# Patient Record
Sex: FEMALE | Race: WHITE
[De-identification: names, ages, dates, MRNs, and addresses within clinical notes are randomized per-mention and may not be internally consistent; named-entity substitution may affect disease eponyms.]

---

## 2017-01-04 ENCOUNTER — HOSPITAL ENCOUNTER (EMERGENCY)
Dept: HOSPITAL 47 - EC | Age: 32
Discharge: HOME | End: 2017-01-04
Payer: MEDICARE

## 2017-01-04 VITALS — HEART RATE: 100 BPM | DIASTOLIC BLOOD PRESSURE: 89 MMHG | RESPIRATION RATE: 16 BRPM | SYSTOLIC BLOOD PRESSURE: 124 MMHG

## 2017-01-04 VITALS — TEMPERATURE: 97.3 F

## 2017-01-04 DIAGNOSIS — F31.9: ICD-10-CM

## 2017-01-04 DIAGNOSIS — R50.9: ICD-10-CM

## 2017-01-04 DIAGNOSIS — R07.9: ICD-10-CM

## 2017-01-04 DIAGNOSIS — R06.02: ICD-10-CM

## 2017-01-04 DIAGNOSIS — F41.0: Primary | ICD-10-CM

## 2017-01-04 DIAGNOSIS — F17.200: ICD-10-CM

## 2017-01-04 DIAGNOSIS — R45.851: ICD-10-CM

## 2017-01-04 DIAGNOSIS — Z88.0: ICD-10-CM

## 2017-01-04 LAB
PH UR: 6.5 [PH] (ref 5–8)
SP GR UR: 1 (ref 1–1.03)
UA BILLING (MACRO VS. MICRO): (no result)
UROBILINOGEN UR QL STRIP: <2 MG/DL (ref ?–2)

## 2017-01-04 PROCEDURE — 82075 ASSAY OF BREATH ETHANOL: CPT

## 2017-01-04 PROCEDURE — 80306 DRUG TEST PRSMV INSTRMNT: CPT

## 2017-01-04 PROCEDURE — 81003 URINALYSIS AUTO W/O SCOPE: CPT

## 2017-01-04 PROCEDURE — 99284 EMERGENCY DEPT VISIT MOD MDM: CPT

## 2017-01-04 NOTE — ED
Psych HPI





- General


Chief Complaint: Psychiatric Symptoms


Stated Complaint: Mental Health


Time Seen by Provider: 17 17:26


Source: patient, RN notes reviewed


Mode of arrival: ambulatory


Limitations: no limitations





- History of Present Illness


Initial Comments: 





31-year-old female presents emergency Department with chief complaint anxiety, 

hopeless feeling.  Patient states that she is very anxious and having a panic 

attack.  Patient states that she has had some suicidal thoughts though no plan.

  She states she just more she feels hopeless.  Patient denies any alcohol or 

drug use at this time.  Patient states that she has been locked up until 

multiple times.  Patient has a physical complaints including chest pain, 

shortness breath, fever, chills.  Patient offers no other complaints.





- Related Data


 Home Medications











 Medication  Instructions  Recorded  Confirmed


 


Acetaminophen/Diphenhydramine 1 tab PO HS PRN 17





[Tylenol -25mg]   








 Previous Rx's











 Medication  Instructions  Recorded


 


QUEtiapine [SEROquel] 25 mg PO TID #28 tab 17











 Allergies











Allergy/AdvReac Type Severity Reaction Status Date / Time


 


Penicillins Allergy  Unknown Verified 17 18:15














Review of Systems


ROS Statement: 


Those systems with pertinent positive or pertinent negative responses have been 

documented in the HPI.





ROS Other: All systems not noted in ROS Statement are negative.





Past Medical History


Past Medical History: No Reported History


History of Any Multi-Drug Resistant Organisms: None Reported


Past Surgical History:  Section


Past Psychological History: Anxiety, Depression, Schizoaffective Disorder


Smoking Status: Current every day smoker


Past Alcohol Use History: Occasional


Past Drug Use History: None Reported





General Exam


Limitations: no limitations


General appearance: alert, in no apparent distress


Head exam: Present: atraumatic, normocephalic, normal inspection


Neck exam: Present: normal inspection.  Absent: tenderness, meningismus, 

lymphadenopathy


Respiratory exam: Present: normal lung sounds bilaterally.  Absent: respiratory 

distress, wheezes, rales, rhonchi, stridor


Cardiovascular Exam: Present: normal rhythm, tachycardia, normal heart sounds.  

Absent: systolic murmur, diastolic murmur, rubs, gallop, clicks


Neurological exam: Present: alert, oriented X3, CN II-XII intact, reflexes 

normal.  Absent: motor sensory deficit


Psychiatric exam: Present: anxious


Skin exam: Present: warm, dry, intact, normal color.  Absent: rash





Course


 Vital Signs











  17





  16:55


 


Temperature 97.3 F L


 


Pulse Rate 112 H


 


Respiratory 18





Rate 


 


Blood Pressure 137/90


 


O2 Sat by Pulse 98





Oximetry 














Medical Decision Making





- Medical Decision Making





31-year-old female presented emergency department for anxiety panic attack.  

Patient was evaluated by EPS and discussed with psychiatrist.  They do 

recommend cervical 25 mg 3 times a day for bipolar disorder.  Patient is not 

suicidal.  Patient will be discharged.





- Lab Data


 Lab Results











  17 Range/Units





  17:50 17:58 


 


Urine Color  Colorless   


 


Urine Appearance  Clear   (Clear)  


 


Urine pH  6.5   (5.0-8.0)  


 


Ur Specific Gravity  1.000 L   (1.001-1.035)  


 


Urine Protein  Negative   (Negative)  


 


Urine Glucose (UA)  Negative   (Negative)  


 


Urine Ketones  Negative   (Negative)  


 


Urine Blood  Negative   (Negative)  


 


Urine Nitrate  Negative   (Negative)  


 


Urine Bilirubin  Negative   (Negative)  


 


Urine Urobilinogen  <2.0   (<2.0)  mg/dL


 


Ur Leukocyte Esterase  Negative   (Negative)  


 


Urine Opiates Screen   Not Detected  (NotDetected)  


 


Ur Oxycodone Screen   Not Detected  (NotDetected)  


 


Urine Methadone Screen   Not Detected  (NotDetected)  


 


Ur Propoxyphene Screen   Not Detected  (NotDetected)  


 


Ur Barbiturates Screen   Not Detected  (NotDetected)  


 


U Tricyclic Antidepress   Not Detected  (NotDetected)  


 


Ur Phencyclidine Scrn   Not Detected  (NotDetected)  


 


Ur Amphetamines Screen   Not Detected  (NotDetected)  


 


U Methamphetamines Scrn   Not Detected  (NotDetected)  


 


U Benzodiazepines Scrn   Not Detected  (NotDetected)  


 


Urine Cocaine Screen   Not Detected  (NotDetected)  


 


U Marijuana (THC) Screen   Not Detected  (NotDetected)  














Disposition


Clinical Impression: 


 Acute anxiety, Bipolar disorder





Disposition: HOME SELF-CARE


Condition: Stable


Instructions:  Generalized Anxiety Disorder (ED)


Additional Instructions: 


Please return to the Emergency Department if symptoms worsen or any other 

concerns.


Prescriptions: 


QUEtiapine [SEROquel] 25 mg PO TID #28 tab


Time of Disposition: 19:14

## 2017-06-09 ENCOUNTER — HOSPITAL ENCOUNTER (EMERGENCY)
Dept: HOSPITAL 47 - EC | Age: 32
LOS: 1 days | Discharge: HOME | End: 2017-06-10
Payer: MEDICARE

## 2017-06-09 VITALS
DIASTOLIC BLOOD PRESSURE: 81 MMHG | HEART RATE: 63 BPM | RESPIRATION RATE: 20 BRPM | TEMPERATURE: 97.7 F | SYSTOLIC BLOOD PRESSURE: 118 MMHG

## 2017-06-09 DIAGNOSIS — Z88.0: ICD-10-CM

## 2017-06-09 DIAGNOSIS — Z79.899: ICD-10-CM

## 2017-06-09 DIAGNOSIS — H60.02: Primary | ICD-10-CM

## 2017-06-09 DIAGNOSIS — F17.200: ICD-10-CM

## 2017-06-09 PROCEDURE — 87070 CULTURE OTHR SPECIMN AEROBIC: CPT

## 2017-06-09 PROCEDURE — 87205 SMEAR GRAM STAIN: CPT

## 2017-06-09 PROCEDURE — 99284 EMERGENCY DEPT VISIT MOD MDM: CPT

## 2017-06-09 PROCEDURE — 69000 DRG XTRNL EAR ABSC/HEM SMPL: CPT

## 2017-06-10 NOTE — ED
Skin/Abscess/FB HPI





- General


Chief complaint: Skin/Abscess/Foreign Body


Stated complaint: Bump Behind Ear


Time Seen by Provider: 17 23:43


Source: patient, RN notes reviewed, old records reviewed


Mode of arrival: ambulatory


Limitations: no limitations





- History of Present Illness


Initial comments: 





This is a 32 year old female with Chief compliant of a large pimple behind the 

left ear. Patient reports that she noticed the swelling 2 days ago, patient 

reports she wears gages in her ears. Patient has no fever or chills, denies any 

problems hearing. Patient reports that she has never had this happen before. 

She reports that it started with irritation from the earrings. 





- Related Data


 Home Medications











 Medication  Instructions  Recorded  Confirmed


 


clonazePAM [KlonoPIN] 0.5 mg PO HS 17








 Previous Rx's











 Medication  Instructions  Recorded


 


Sulfamethox-Tmp 800-160Mg [Bactrim 1 tab PO Q12HR #20 tab 06/10/17





-160 mg]  











 Allergies











Allergy/AdvReac Type Severity Reaction Status Date / Time


 


Penicillins Allergy  Unknown Verified 17 23:38














Review of Systems


ROS Statement: 


Those systems with pertinent positive or pertinent negative responses have been 

documented in the HPI.





ROS Other: All systems not noted in ROS Statement are negative.


Constitutional: Denies: fever, chills


Eyes: Denies: eye pain


Endocrine: Denies: heat or cold intolerance


Gastrointestinal: Denies: abdominal pain


Genitourinary: Denies: dysuria


Musculoskeletal: Denies: back pain


Neurological: Denies: headache, weakness


Psychiatric: Denies: anxiety, depression


Hematological/Lymphatic: Denies: easy bleeding





Past Medical History


Past Medical History: No Reported History


Additional Past Medical History / Comment(s): anxiety


History of Any Multi-Drug Resistant Organisms: None Reported


Past Surgical History:  Section


Past Psychological History: Anxiety, Depression, Schizoaffective Disorder


Smoking Status: Current every day smoker


Past Alcohol Use History: Occasional


Past Drug Use History: None Reported





General Exam


Limitations: no limitations


General appearance: alert, in no apparent distress


Head exam: Present: atraumatic, normocephalic, normal inspection


Eye exam: Present: normal appearance, PERRL, EOMI.  Absent: scleral icterus, 

conjunctival injection, periorbital swelling


ENT exam: Present: normal exam, mucous membranes moist, other (Patient has 

erythema and swelling behind the right earlobe.  Evidence of a superficial 

abscess.)





Course


 Vital Signs











  06/09/17 06/10/17





  23:35 00:32


 


Temperature 97.7 F 97.7 F


 


Pulse Rate 63 63


 


Respiratory 20 20





Rate  


 


Blood Pressure 118/81 118/81


 


O2 Sat by Pulse 98 98





Oximetry  














Procedures





- Incision & Drainage


Consent Obtained: verbal consent


Indication: left earlobe access


Size (cm): 2


Sterile Field Used?: Yes


Scalpel Used: #11


I&D Drainage Obtained: Pus, Blood


Culture Obtained?: Yes


Patient Tolerated Procedure: well, no complications





Medical Decision Making





- Medical Decision Making


This is a 32 year old female with Chief compliant of a large pimple behind the 

left ear. Patient reports that she noticed the swelling 2 days ago, patient 

reports she wears gages in her ears. Patient has no fever or chills, denies any 

problems hearing. Patient reports that she has never had this happen before. 

She reports that it started with irritation from the earrings.  Patient ear 

abscess measures 2cm, and patient had incision and drainage. PAtient had pus 

drainage from the area. Patient will be started on antibiotics. PAtient could 

not be packed with iodoform due to location of area. Patient advised to follow 

up with PCP and  to complete antibiotic prescription. Patient understands 

treatment planand will comply. 








Disposition


Clinical Impression: 


 Abscess, earlobe





Disposition: HOME SELF-CARE


Condition: Good


Instructions:  Abscess (ED)


Additional Instructions: 


Monitor for any worsening signs of infection including worsening swelling 

redness and drainage.  Follow-up with her primary care provider if symptoms 

continue to persist.  Return to emergency department if any alarming signs or 

symptoms occur.  Completely anabiotic prescription.


Prescriptions: 


Sulfamethox-Tmp 800-160Mg [Bactrim -160 mg] 1 tab PO Q12HR #20 tab


Referrals: 


Naren Cook DO [Primary Care Provider] - 1-2 days


Time of Disposition: 00:17

## 2020-03-06 ENCOUNTER — HOSPITAL ENCOUNTER (OUTPATIENT)
Dept: HOSPITAL 47 - RADUSWWP | Age: 35
Discharge: HOME | End: 2020-03-06
Attending: NURSE PRACTITIONER
Payer: COMMERCIAL

## 2020-03-06 DIAGNOSIS — R10.2: Primary | ICD-10-CM

## 2020-03-06 PROCEDURE — 76856 US EXAM PELVIC COMPLETE: CPT

## 2020-03-06 PROCEDURE — 76830 TRANSVAGINAL US NON-OB: CPT

## 2020-03-06 NOTE — US
EXAMINATION TYPE: US pelvis complete transvag

 

DATE OF EXAM: 3/6/2020

 

COMPARISON: NONE

 

CLINICAL HISTORY: R10.2 PELVIC AND PERINEAL PAIN.

 

Patient has left adnexal pain that extends down her leg when she lays down.

 

TECHNIQUE:  Transvaginal (TV) and Transabdominal (TA) .  Transabdominal sonographic images of the pel
vis were ordered by physician.

 

Date of LMP:  2/28/20

 

EXAM MEASUREMENTS:

 

Uterus:  9.2 x 5.0 x 5.7 cm

Endometrial Stripe: 0.7 cm

Right Ovary:  2.1 x 1.7 x 1.7 cm

Left Ovary:  2.0 x 1.1 x 1.4 cm

 

1. Uterus:  Anteverted   wnl

2. Endometrium:  wnl

3. Right Ovary:  wnl

4. Left Ovary:  wnl

5. Bilateral Adnexa:  wnl

6. Posterior cul-de-sac:  wnl

 

 

IMPRESSION: Unremarkable pelvic ultrasound. Endometrial thickness is within normal limits.

## 2020-09-22 ENCOUNTER — HOSPITAL ENCOUNTER (OUTPATIENT)
Dept: HOSPITAL 47 - LABPAT | Age: 35
Discharge: HOME | End: 2020-09-22
Attending: OBSTETRICS & GYNECOLOGY
Payer: COMMERCIAL

## 2020-09-22 DIAGNOSIS — Z01.818: Primary | ICD-10-CM

## 2020-09-22 LAB
BASOPHILS # BLD AUTO: 0 K/UL (ref 0–0.2)
BASOPHILS NFR BLD AUTO: 1 %
EOSINOPHIL # BLD AUTO: 0.1 K/UL (ref 0–0.7)
EOSINOPHIL NFR BLD AUTO: 2 %
ERYTHROCYTE [DISTWIDTH] IN BLOOD BY AUTOMATED COUNT: 4.26 M/UL (ref 3.8–5.4)
ERYTHROCYTE [DISTWIDTH] IN BLOOD: 11.7 % (ref 11.5–15.5)
HCT VFR BLD AUTO: 42.4 % (ref 34–46)
HGB BLD-MCNC: 14 GM/DL (ref 11.4–16)
LYMPHOCYTES # SPEC AUTO: 1.7 K/UL (ref 1–4.8)
LYMPHOCYTES NFR SPEC AUTO: 29 %
MCH RBC QN AUTO: 32.9 PG (ref 25–35)
MCHC RBC AUTO-ENTMCNC: 33.1 G/DL (ref 31–37)
MCV RBC AUTO: 99.5 FL (ref 80–100)
MONOCYTES # BLD AUTO: 0.3 K/UL (ref 0–1)
MONOCYTES NFR BLD AUTO: 5 %
NEUTROPHILS # BLD AUTO: 3.6 K/UL (ref 1.3–7.7)
NEUTROPHILS NFR BLD AUTO: 62 %
PLATELET # BLD AUTO: 211 K/UL (ref 150–450)
WBC # BLD AUTO: 5.9 K/UL (ref 3.8–10.6)

## 2020-09-22 PROCEDURE — 36415 COLL VENOUS BLD VENIPUNCTURE: CPT

## 2020-09-22 PROCEDURE — 85025 COMPLETE CBC W/AUTO DIFF WBC: CPT

## 2020-09-23 NOTE — P.HPOB
History of Present Illness


H&P Date: 20


Chief Complaint: Cervical dysplasia





Amparo is a 35-year-old female with MATTY-3 on colposcopy.  She is scheduled for a 

LEEP colposcopy.  Risks/benefits/alternatives to this procedure were reviewed 

the patient in detail all questions were answered for her prior to proceeding to

the operative room.  On physical exam vital signs are stable and afebrile.  

Heart regular, lungs clear, extremities without pain.  Abdomen soft and pelvic 

exam is otherwise unremarkable.  She was having some palpitations when this was 

scheduled and surgical clearance from her primary care prior was obtained.





Past Medical History


Past Medical History: No Reported History


Additional Past Medical History / Comment(s): anxiety


History of Any Multi-Drug Resistant Organisms: None Reported


Past Surgical History:  Section


Past Psychological History: Anxiety, Depression, Schizoaffective Disorder


Past Alcohol Use History: Occasional


Past Drug Use History: None Reported





Medications and Allergies


                                Home Medications











 Medication  Instructions  Recorded  Confirmed  Type


 


clonazePAM [KlonoPIN] 0.5 mg PO HS 17 History


 


Sulfamethox-Tmp 800-160Mg [Bactrim 1 tab PO Q12HR #20 tab 06/10/17  Rx





-160 mg]    








                                    Allergies











Allergy/AdvReac Type Severity Reaction Status Date / Time


 


Penicillins Allergy  Unknown Verified 17 23:38














Exam


Osteopathic Statement: *.  No significant issues noted on an osteopathic 

structural exam other than those noted in the History and Physical/Consult.





- OBG Physical Exam


Breast: both: normal (no masses)


Abdomen: bowel sounds normal, no diffuse tenderness, no bruit present, no 

guarding noted, no hepatomegaly, no splenomegaly, no mass


Vulva: both: normal


Vagina: normal moisture, no discharge


Cervix: no lesion, no discharge


Uterus: normal size, normal contour


Adnexa: both: normal


Anus/Rectum: normal perianal skin, no rectal mass, no hemorrhoids, heme negative

## 2020-09-24 ENCOUNTER — HOSPITAL ENCOUNTER (OUTPATIENT)
Dept: HOSPITAL 47 - OR | Age: 35
Discharge: HOME | End: 2020-09-24
Attending: OBSTETRICS & GYNECOLOGY
Payer: COMMERCIAL

## 2020-09-24 VITALS — RESPIRATION RATE: 16 BRPM

## 2020-09-24 VITALS — HEART RATE: 60 BPM | SYSTOLIC BLOOD PRESSURE: 105 MMHG | DIASTOLIC BLOOD PRESSURE: 63 MMHG

## 2020-09-24 VITALS — TEMPERATURE: 97 F

## 2020-09-24 DIAGNOSIS — F41.9: ICD-10-CM

## 2020-09-24 DIAGNOSIS — Z88.0: ICD-10-CM

## 2020-09-24 DIAGNOSIS — F25.9: ICD-10-CM

## 2020-09-24 DIAGNOSIS — Z98.891: ICD-10-CM

## 2020-09-24 DIAGNOSIS — F32.9: ICD-10-CM

## 2020-09-24 DIAGNOSIS — Z79.899: ICD-10-CM

## 2020-09-24 DIAGNOSIS — N87.1: Primary | ICD-10-CM

## 2020-09-24 PROCEDURE — 88307 TISSUE EXAM BY PATHOLOGIST: CPT

## 2020-09-24 PROCEDURE — 84703 CHORIONIC GONADOTROPIN ASSAY: CPT

## 2020-09-24 PROCEDURE — 81025 URINE PREGNANCY TEST: CPT

## 2020-09-24 PROCEDURE — 57460 BX OF CERVIX W/SCOPE LEEP: CPT

## 2020-09-24 RX ADMIN — ONDANSETRON ONE MG: 2 INJECTION INTRAMUSCULAR; INTRAVENOUS at 09:54

## 2020-09-24 RX ADMIN — DEXTROSE ONE MG: 50 INJECTION, SOLUTION INTRAVENOUS at 09:54

## 2020-09-24 RX ADMIN — DEXTROSE ONE MG: 50 INJECTION, SOLUTION INTRAVENOUS at 10:14

## 2020-09-24 RX ADMIN — ONDANSETRON ONE MG: 2 INJECTION INTRAMUSCULAR; INTRAVENOUS at 10:14

## 2020-09-24 NOTE — P.OP
Date of Procedure: 09/24/20


Preoperative Diagnosis: 


Cervical dysplasia


Postoperative Diagnosis: 


Same


Procedure(s) Performed: 


LEEP colposcopy


Anesthesia: MADELIN


Surgeon: Scot Winter


Estimated Blood Loss (ml): 2


Pathology: other (Cervical cone)


Condition: stable


Disposition: same day


Operative Findings: 


Pathology pending


Description of Procedure: 


Patient was taken to the operating suite where a general anesthetic was found be

adequate.  She was prepped and draped in normal sterile fashion and placed in 

the dorsal lithotomy position.  Initially a coated speculum was inserted in the 

vagina and the cervix was covered with Lugol solution.  Using a colposcope the 

areas of dysplasia were identified using a 2 cm loop the cervical cone was 

excised.  Completed was marked 12:00 and sent to pathology for evaluation.  Once

this was completed, a ball-tipped cautery was then used to obtain excellent 

hemostasis across the ectocervix and endocervix and was used to desiccate any 

potential remaining dysplastic tissue.  All instruments were then removed.  

Sponge, lap, and counts were all correct 2.  Patient was then taken to the 

recovery room in stable and satisfactory condition.





Plan - Discharge Summary


New Discharge Prescriptions: 


New


   Ibuprofen [Motrin] 600 mg PO Q6HR PRN #30 tab


     PRN Reason: Pain





No Action


   Ibuprofen [Motrin] 600 mg PO Q6HR PRN


     PRN Reason: Pain


Discharge Medication List





Ibuprofen [Motrin] 600 mg PO Q6HR PRN 09/24/20 [History]


Ibuprofen [Motrin] 600 mg PO Q6HR PRN #30 tab 09/24/20 [Rx]








Follow up Appointment(s)/Referral(s): 


Scot Winter DO [Doctor of Osteopathic Medicine] - 10 Days


Activity/Diet/Wound Care/Special Instructions: 


No heavy lifting, limit stairs and driving, and pelvic rest.  If any high 

temperatures, heavy bleeding, or severe pain call my office